# Patient Record
(demographics unavailable — no encounter records)

---

## 2025-03-25 NOTE — DATA REVIEWED
[de-identified] : see HPI [de-identified] : see HPI [de-identified] : Pathology reports reviewed

## 2025-03-25 NOTE — HISTORY OF PRESENT ILLNESS
[de-identified] : Verena is a healthy 48-year-old female  who has a known goiter and Hashimoto's thyroiditis since the age of 36.  She is on thyroid hormone replacement, 125 mcg daily and with a TSH of 1.59 in March.  She had a benign FNA ~ 5 years ago but she is not aware of where the FNA was taken from.  She is currently being monitored by Dr. Effie Ford MD her endocrinologist for slow progressive growth.  She had a thyroid ultrasound obtained in March of this year and this demonstrated a diffusely heterogeneous irregular and slightly enlarged thyroid gland with both the right and left lobes measuring 6 cm in sagittal height.  There were no discrete nodules seen or evidence of cervical adenopathy and stable when compared to the examination in November of 2013.  Because of the complaints of mild dysphagia she had an esophagram and reports that there was some degree of obstruction or regurgitation. The records of that study were not available on today's evaluation. She has a many year history of large pills and meat becoming caught in her esophagus if she does not chew well.  She feels that the problem has progressed in terms of her mild dysphagia associated with daily GERD for the past 6-7 months and feels that this may be exacerbating her pressure sensation.  She denies any recent voice changes, shortness of breath, or  neck pain.  She is now concerned because she does not want to wait until the thyroid enlargement progresses further and she develops increasing compressive symptoms.  Her mother and several maternal cousins have goiters and Hashimoto's thyroiditis.  There is no family history of thyroid cancer.  She has no known radiation exposures in her youth.   \par   [FreeTextEntry1] : Verena returns after an uneventful total thyroidectomy for a goiter on 5/15/19.  She had normal Ca/PTH levels on discharge.  Post op labs were all WNL.  Her voice is back to baseline.  Surgical pathology revealed several foci of papillary microcarcinoma confined to the gland and no (+) nodes. She is taking LT4 137 mcg x 6 days and followed by Dr. Mckeon.  She had a gastric by pass on 10/29/2020 and she lost 70 pounds and stable. She had a neck ultrasound dated 12/9/2024 demonstrating absence of her thyroid gland and no new masses in the surgical thyroid bed. However, there is description of borderline bilateral level I and level 3 lymph nodes some that lack a central fatty hilum and not further characterized. Other morphologically normal cervical lymph nodes are noted bilaterally.  She actually had bilateral level 2 lymph nodes biopsied and in July 2021 and they were cytologically benign favoring reactive lymph nodes.  Her last set of blood tests from 10/31/2024 revealed a TSH of 1.72, her Tg and Tg AB were undetectable.  Her neck has healed well. Verena denies recent shortness of breath, voice changes (only am hoarseness), dysphagia, anterior neck pain, neck pressure or mass. She does have intermittent GERD since her gastric bypass. She uses TUMS PRN.  There is no family history of thyroid cancer. She denies any known radiation exposures in her youth. She is euthyroid. She denies fever, body aches, cough, cyanosis, chest burning, anosmia or recent known COVID exposures.  She was vaccinated and boosted initially.  All family members at home are well.  -------------------------------------------------------------------------------------------------------------------------------------------------------------------------------------

## 2025-03-25 NOTE — REASON FOR VISIT
[FreeTextEntry2] : a follow up visit after total thyroidectomy and occult Warthin's type papillary thyroid carcinoma [FreeTextEntry1] : Sherie MURRAY MD, PCP  Abdon Mckeon MD Endocrinologist

## 2025-03-25 NOTE — PROCEDURE
[Image(s) Captured] : image(s) captured and filed [Unable to Cooperate with Mirror] : patient unable to cooperate with mirror [Gag Reflex] : gag reflex preventing mirror examination [Dysphagia] : dysphagia not clearly evaluated by indirect laryngoscopy [Globus] : globus [Topical Lidocaine] : topical lidocaine [Oxymetazoline HCl] : oxymetazoline HCl [Flexible Endoscope] : examined with the flexible endoscope [Serial Number: ___] : Serial Number: [unfilled] [Hoarseness] : hoarseness not clearly evaluated by indirect laryngoscopy [FreeTextEntry3] : [facility] POST THYROIDECT[facility]Y NECK ULTRASOUND REPORT  NAME: GILLIAN HALL .Paulina...           MR# [id number].....	              : 1967.....	         DATE: 3/25/2025.  HISTORY/ INDICATIONS: A 57-year-old female status post total thyroidectomy for multifocal papillary thyroid carcinoma.  Recent neck ultrasound suggested multiple level 1 and 3 lymph nodes that lack echogenic bonny and FNA biopsy recommended as well as CT. this patient with prior FNA biopsies of bilateral level 2 lymph nodes in  that were reactive on cytology.  COMPARISON: None.  PROCEDURE: Signing physician referred and performed high-resolution ultrasound gray scale imaging and color Doppler supplementation of the thyroid gland surgical bed and neck was obtained in the longitudinal and transverse planes using a 13 MHz linear transducer with image capture.  All measurements are in centimeters (longitudinal x AP x transverse).    FINDINGS: The thyroid gland has been surgically removed and replaced by echogenic scar tissue.  There are no hypoechoic nodules, vascular lesions or calcifications in the central neck compartment.   PARATHYROID GLANDS: There are no identified enlarged parathyroid glands in the central neck compartment.   LYMPH NODES: Bilateral neck levels I - VI were examined.  There are several benign appearing sub centimeter lymph nodes identified at neck levels II- III bilaterally (lateral neck), all with echogenic hilar lines, no calcifications or cystic degeneration and have a short long axis ratio < 0.5 in the transverse plane.  There are no enlarged or abnormal appearing central compartment, level VI lymph nodes.  All the lymph nodes identified had either fatty echogenic bonny or a central vascular pedicle on color Doppler flow without other significant features to suggest malignancy.  IMPRESSION: 57-year-old female s/p total thyroidectomy for multifocal papillary thyroid carcinoma primarily involving the left lobe.  There is no current evidence for residual thyroid tissue, recurrent tumor, enlarged or morphologically abnormal suspicious cervical lymph nodes levels I - VI.    RECOMMENDATIONS: Repeat thyroid ultrasound in 1 year if there is ongoing clinical concern for malignancy as well as continued monitoring of TSH and thyroglobulin levels.   Electronically signed by referring, interpreting and reporting physician: Trung Espinal MD on 3/25/2025, 11:42 AM.  [facility]: [address] [place] [address], [address] [place] [address] [phone] (voice), [phone] (fax). -------------------------------------------------------------------------------------------------------------------------------------------------------------------------------------   [de-identified] : Verbal informed consent was obtained for fiber optic laryngoscopy.  Findings: The nasal septum is minimally deviated to the right. There are no masses and the nasal mucosa and secretions are normal. There is a small left middle meatus polyp with a benign appearance. The choanae and posterior nasopharynx are normal without masses or drainage. The Eustachian tube orifices appear patent. The pharynx, including the posterior and lateral pharyngeal walls, the vallecula and base of tongue are normal without ulcerations, lesions or masses. The hypopharynx including the pyriform sinuses open well without pooling of secretions, mucosal lesions or masses. The supraglottic larynx including the epiglottis, petiole, glossoepiglottic folds and pharyngoepiglottic folds are normal without mucosal lesions, ulcerations or masses. The glottis reveals normal false vocal folds. The posterior cricoid area has healthy pink mucosa in the interarytenoid area and esophageal inlet. There is mild pachydermia of the interarytenoid mucosa and prominent lingual tonsils suggestive of posterior laryngitis from laryngopharyngeal acid reflux disease. The trachea is clear without narrowing or lesions.-------------------------------------------------------------------------------------------------------------------------------------------------------------------------------------   [de-identified] : s/p total thyroidectomy and papillary thyroid carcinoma

## 2025-03-25 NOTE — CONSULT LETTER
[Dear  ___] : Dear  [unfilled], [Consult Letter:] : I had the pleasure of evaluating your patient, [unfilled]. [Please see my note below.] : Please see my note below. [Consult Closing:] : Thank you very much for allowing me to participate in the care of this patient.  If you have any questions, please do not hesitate to contact me. [Sincerely,] : Sincerely, [FreeTextEntry3] : \par  Trung Espinal M.D., FACS, ECNU\par  Director [facility]\par  [facility]\par  Certified in Thyroid/Parathyroid/Neck Ultrasound, ECNU/ AIUM\par  \par  , Department of Otolaryngology\par  [facility] [employer] [facility]\par